# Patient Record
Sex: MALE | Race: OTHER | Employment: FULL TIME | URBAN - METROPOLITAN AREA
[De-identification: names, ages, dates, MRNs, and addresses within clinical notes are randomized per-mention and may not be internally consistent; named-entity substitution may affect disease eponyms.]

---

## 2017-01-13 ENCOUNTER — HOSPITAL ENCOUNTER (EMERGENCY)
Age: 52
Discharge: HOME | End: 2017-01-13
Attending: EMERGENCY MEDICINE

## 2017-01-13 VITALS
SYSTOLIC BLOOD PRESSURE: 138 MMHG | HEART RATE: 81 BPM | WEIGHT: 200 LBS | RESPIRATION RATE: 20 BRPM | BODY MASS INDEX: 31.39 KG/M2 | HEIGHT: 67 IN | TEMPERATURE: 98.7 F | OXYGEN SATURATION: 99 % | DIASTOLIC BLOOD PRESSURE: 93 MMHG

## 2017-01-13 DIAGNOSIS — R10.9 ABDOMINAL DISCOMFORT: Primary | ICD-10-CM

## 2017-01-13 LAB
ANION GAP BLD CALC-SCNC: 21 MMOL/L (ref 5–15)
BILIRUB UR QL: NEGATIVE
BUN BLD-MCNC: 9 MG/DL (ref 9–20)
CA-I BLD-MCNC: 1.11 MMOL/L (ref 1.12–1.32)
CHLORIDE BLD-SCNC: 98 MMOL/L (ref 98–107)
CO2 BLD-SCNC: 24 MMOL/L (ref 21–32)
CREAT BLD-MCNC: 0.7 MG/DL (ref 0.6–1.3)
GLUCOSE BLD-MCNC: 218 MG/DL (ref 75–110)
GLUCOSE UR QL STRIP.AUTO: 500 MG/DL
HCT VFR BLD CALC: 47 % (ref 36.6–50.3)
HGB BLD-MCNC: 16 GM/DL (ref 12.1–17)
KETONES UR-MCNC: NEGATIVE MG/DL
LEUKOCYTE ESTERASE UR QL STRIP: NEGATIVE
NITRITE UR QL: NEGATIVE
PH UR: 6 [PH] (ref 5–8)
POTASSIUM BLD-SCNC: 3.6 MMOL/L (ref 3.5–5.1)
PROT UR QL: 100 MG/DL
RBC # UR STRIP: ABNORMAL /UL
SERVICE CMNT-IMP: ABNORMAL
SODIUM BLD-SCNC: 139 MMOL/L (ref 136–145)
SP GR UR: 1.02 (ref 1–1.03)
UROBILINOGEN UR QL: 0.2 EU/DL (ref 0.2–1)

## 2017-01-13 RX ORDER — BUPRENORPHINE HYDROCHLORIDE AND NALOXONE HYDROCHLORIDE DIHYDRATE 2; .5 MG/1; MG/1
TABLET SUBLINGUAL DAILY
COMMUNITY

## 2017-01-13 RX ORDER — OMEPRAZOLE 20 MG/1
20 CAPSULE, DELAYED RELEASE ORAL DAILY
COMMUNITY

## 2017-01-13 NOTE — DISCHARGE INSTRUCTIONS

## 2017-01-13 NOTE — UC PROVIDER NOTE
Patient is a 45 y.o. male presenting with abdominal pain. History provided by: pt forgot omeprazole yesterday adn had dinner then began to feel badly. He went to bed and woke with abd pain-burning and aching like previous episodes and also had fever. He took 2 tylenol adn went back to bed then woke at 0600 and took omeprazole    Abdominal Pain    This is a recurrent (after taking the omprazole at 0600 with coffee, he vomited bile 2 times and went back to bed. He continues to feel fatigued and have no appetite. he had a normal BM today. He had had these episode like this before) problem. The problem occurs constantly. The problem has been gradually improving. Associated with: missing med. The pain is located in the generalized abdominal region. Quality: burning, now just feels \"not well\" Associated symptoms include anorexia, a fever, nausea and vomiting. Pertinent negatives include no belching, no diarrhea, no flatus, no melena, no constipation, no dysuria, no myalgias, no chest pain and no back pain. Exacerbated by: eating. The pain is relieved by nothing. Past workup includes esophagogastroduodenoscopy. Past medical history comments: h pylori. History reviewed. No pertinent past medical history. History reviewed. No pertinent past surgical history. History reviewed. No pertinent family history. Social History     Social History    Marital status: SINGLE     Spouse name: N/A    Number of children: N/A    Years of education: N/A     Occupational History    Not on file.      Social History Main Topics    Smoking status: Never Smoker    Smokeless tobacco: Not on file    Alcohol use Not on file    Drug use: Not on file    Sexual activity: Not on file     Other Topics Concern    Not on file     Social History Narrative    No narrative on file                ALLERGIES: Aspirin and Nsaids (non-steroidal anti-inflammatory drug)    Review of Systems   Constitutional: Positive for fatigue and fever. Respiratory: Negative for cough, chest tightness and shortness of breath. Cardiovascular: Negative for chest pain and leg swelling. Gastrointestinal: Positive for abdominal pain, anorexia, nausea and vomiting. Negative for blood in stool, constipation, diarrhea, flatus and melena. Genitourinary: Negative for dysuria. Musculoskeletal: Negative for back pain and myalgias. Neurological: Negative. Vitals:    01/13/17 1756 01/13/17 1800   BP:  (!) 138/93   Pulse:  81   Resp:  20   Temp:  98.7 °F (37.1 °C)   SpO2:  99%   Weight: 90.7 kg (200 lb)    Height: 5' 7\" (1.702 m)        Physical Exam   Constitutional: He is oriented to person, place, and time. He appears well-developed and well-nourished. Uncomfortable and moderately ill but non toxic, sipping water   HENT:   Head: Normocephalic and atraumatic. Mouth/Throat: Oropharynx is clear and moist. No oropharyngeal exudate. Eyes: Conjunctivae and EOM are normal. Right eye exhibits no discharge. Left eye exhibits no discharge. No scleral icterus. Cardiovascular: Normal rate, regular rhythm, normal heart sounds and intact distal pulses. No murmur heard. Pulmonary/Chest: Effort normal and breath sounds normal. No respiratory distress. He has no wheezes. He has no rales. Abdominal: Soft. Bowel sounds are normal. He exhibits no distension. There is no tenderness. There is no rebound and no guarding. Hyperactive bowel sounds   Musculoskeletal: Normal range of motion. He exhibits no edema or tenderness. Neurological: He is alert and oriented to person, place, and time. No cranial nerve deficit. Coordination normal.   Skin: Skin is warm. No rash noted. No erythema. Psychiatric: He has a normal mood and affect. His behavior is normal. Judgment and thought content normal.   Nursing note and vitals reviewed.       MDM     Differential Diagnosis; Clinical Impression; Plan:     CLINICAL IMPRESSION:  Abdominal discomfort  (primary encounter diagnosis)    Plan:  1. Close fu --ED tonight if no improvement  2.   3.     Amount and/or Complexity of Data Reviewed:   Clinical lab tests:  Ordered and reviewed  Risk of Significant Complications, Morbidity, and/or Mortality:   Presenting problems: Moderate  Management options:   Moderate  Progress:   Patient progress:  Stable      Procedures

## 2017-01-13 NOTE — LETTER
NOTIFICATION RETURN TO WORK / SCHOOL 
 
1/13/2017 6:54 PM 
 
Mr. Carlito Rascon [address] [place] [id number] To Whom It May Concern: Carlito Rascon is currently under the care of [address] [place] [address]. He will return to work/school on: 1/15/17 If there are questions or concerns please have the patient contact our office. Sincerely, Kip Khan.  DO